# Patient Record
Sex: FEMALE | Race: WHITE
[De-identification: names, ages, dates, MRNs, and addresses within clinical notes are randomized per-mention and may not be internally consistent; named-entity substitution may affect disease eponyms.]

---

## 2017-05-11 NOTE — EDM.PDOC
ED HPI GENERAL MEDICAL PROBLEM





- General


Chief Complaint: Respiratory Problem


Stated Complaint: Cough


Time Seen by Provider: 05/11/17 21:30


Source of Information: Reports: Patient, RN Notes Reviewed


History Limitations: Reports: No Limitations





- History of Present Illness


INITIAL COMMENTS - FREE TEXT/NARRATIVE: 





71 year old female presents to the ED with complaints of a cough productive of 

occasional white/thin sputum. She's had the cough for approximately 10 days. 

She has associated symptoms of hoarse voice, sinus congestion and pressure, 

occasional runny nose, and ear pressure. She denies fever or chills, body aches

, throat pain. No chest pain or shortness of breath. She has a history of 

seasonal allergies and says her allergies do see to be bothering her. She has 

tried afrin for sinus congestion. 





She is not a smoker and has no history of asthma or COPD. 





- Related Data


 Allergies











Allergy/AdvReac Type Severity Reaction Status Date / Time


 


propoxyphene [From Darvon] Allergy  Dizziness Verified 01/07/17 20:23


 


venom-honey bee Allergy  Hives Verified 08/15/16 19:42





[bee venom (honey bee)]     











Home Meds: 


 Home Meds





LORazepam [Ativan] 1 mg PO BID 08/15/16 [History]


buPROPion [Wellbutrin] 300 mg PO DAILY 08/15/16 [History]


Naproxen 500 mg PO BID 01/07/17 [History]


Azithromycin [Zithromax] 250 mg PO DAILY #6 tablet 05/11/17 [Rx]











Past Medical History


Gastrointestinal History: Reports: Chronic Diarrhea


OB/GYN History: Reports: Polycystic Ovaries, Pregnancy, Other (See Below)


Other OB/BYN History: A and P repair


Psychiatric History: Reports: Anxiety





- Past Surgical History


Musculoskeletal Surgical History: Reports: Hip Replacement





Social & Family History





- Family History


Family Medical History: Noncontributory





- Tobacco Use


Smoking Status *Q: Never Smoker





- Caffeine Use


Caffeine Use: Reports: Coffee, Tea





- Recreational Drug Use


Recreational Drug Use: No





ED ROS GENERAL





- Review of Systems


Review Of Systems: See Below


Constitutional: Reports: No Symptoms.  Denies: Fever, Chills, Diaphoresis


HEENT: Reports: Ear Pain, Rhinitis, Sinus Problem, Other (hoarse voice ).  

Denies: Throat Pain


Respiratory: Reports: Cough, Sputum.  Denies: Shortness of Breath


Cardiovascular: Reports: No Symptoms.  Denies: Chest Pain, Edema, 

Lightheadedness


GI/Abdominal: Reports: No Symptoms.  Denies: Abdominal Pain, Diarrhea, Vomiting





ED EXAM, GENERAL





- Physical Exam


Exam: See Below


Exam Limited By: No Limitations


General Appearance: Alert, WD/WN, No Apparent Distress


Ears: Normal External Exam, Normal Canal, Hearing Grossly Normal, Normal TMs, 

Other (fluid behind left TM. no erythema or sign of infection )


Ear Exam: Right Ear: TM normal, Left Ear: TM Dull, Bilateral Ear: Auricle Normal

, Canal Normal


Nose: Normal Inspection, Normal Mucosa


Throat/Mouth: Normal Inspection, Normal Oropharynx, Other (hoarse voice )


Neck: Normal Inspection, Supple, Non-Tender


Respiratory/Chest: No Respiratory Distress, Lungs Clear, Normal Breath Sounds, 

No Accessory Muscle Use, Chest Non-Tender, Other (occasional dry sounding cough)


Cardiovascular: Regular Rate, Rhythm, No Edema, No Murmur


Neurological: Alert, Oriented, Normal Cognition


Skin Exam: Warm, Dry, Intact





Course





- Vital Signs


Last Recorded V/S: 


 Last Vital Signs











Temp  97.8 F   05/11/17 21:19


 


Pulse  94   05/11/17 21:19


 


Resp  20   05/11/17 21:19


 


BP  133/82   05/11/17 21:19


 


Pulse Ox  98   05/11/17 21:19














Departure





- Departure


Time of Disposition: 21:42


Disposition: Home, Self-Care 01


Condition: good


Clinical Impression: 


 Bronchitis





Allergic rhinitis


Qualifiers:


 Allergic rhinitis trigger: unspecified Allergic rhinitis seasonality: seasonal 

Qualified Code(s): J30.2 - Other seasonal allergic rhinitis








- Discharge Information


Prescriptions: 


Azithromycin [Zithromax] 250 mg PO DAILY #6 tablet


Instructions:  Acute Bronchitis, Easy-to-Read, Allergic Rhinitis


Referrals: 


Tyra Pelletier MD [Primary Care Provider] - 


Forms:  ED Department Discharge


Additional Instructions: 


Zithromax 2 tabs on day 1, then 1 tab daily for next 4 days


Flonase 1 spray to each nostril twice a day for allergies


Zyrtec or Claritin once a day for allergy symptoms


Tylenol as needed for discomfort


Follow-up in clinic next week if not improved


Return to ER with worsening of symptoms

## 2017-06-20 NOTE — PCM.PREANE
Preanesthetic Assessment





- Anesthesia/Transfusion/Family Hx


Anesthesia History: Prior Anesthesia Without Reaction


Family History of Anesthesia Reaction: No


Transfusion History: No Prior Transfusion(s)





- Review of Systems


General: No Symptoms, Other


Pulmonary: Other (s/p bronchitis 3 weeks ago, no residual )


Cardiovascular: Chest Pain (rarely, s/p workup 2 weeks ago, all negative)


Gastrointestinal: No symptoms


Neurological: No Symptoms


Other: Reports: Depression





- Physical Assessment


NPO Status Date: 06/19/17


NPO Status Time: 21:00


Pulse: 71


O2 Sat by Pulse Oximetry: 98


Respiratory Rate: 16


Blood Pressure: 118/68


Vital Signs: 





 Last Vital Signs











Temp  36.5 C   06/20/17 09:35


 


Pulse  71   06/20/17 09:35


 


Resp  16   06/20/17 09:35


 


BP  118/68   06/20/17 09:35


 


Pulse Ox  98   06/20/17 09:35











Height: 1.63 m


Weight: 68.946 kg


ASA Class: 2


Mental Status: Alert & Oriented x3


Airway Class: Mallampati = 2


Dentition: Reports: Normal Dentition


Thyro-Mental Finger Breadths: 2


Mouth Opening Finger Breadths: 2


ROM/Head Extension: Full


Lungs: Clear to auscultation, Normal respiratory effort


Cardiovascular: Regular Rate, Regular Rhythm





- Allergies


Allergies/Adverse Reactions: 


 Allergies











Allergy/AdvReac Type Severity Reaction Status Date / Time


 


venom-honey bee Allergy  Hives Verified 06/19/17 16:02





[bee venom (honey bee)]     


 


propoxyphene [From Darvon] AdvReac  Dizziness Verified 06/19/17 16:02














- Blood


Blood Available: No


Product(s) Available: None





- Anesthesia Plan


Pre-Op Medication Ordered: None





- Acknowledgements


Anesthesia Type Planned: MAC


Pt an Appropriate Candidate for the Planned Anesthesia: Yes


Alternatives and Risks of Anesthesia Discussed w Pt/Guardian: Yes


Pt/Guardian Understands and Agrees with Anesthesia Plan: Yes





PreAnesthesia Questionnaire


Gastrointestinal History: Reports: Chronic Diarrhea


OB/GYN History: Reports: Polycystic Ovaries, Pregnancy, Other (See Below)


Other OB/BYN History: A and P repair


Psychiatric History: Reports: Anxiety





- Past Surgical History


Musculoskeletal Surgical History: Reports: Hip Replacement





- SUBSTANCE USE


Smoking Status *Q: Never Smoker


Recreational Drug Use History: No





- HOME MEDS


Home Medications: 


 Home Meds





LORazepam [Ativan] 1 mg PO BID 08/15/16 [History]


buPROPion [Wellbutrin] 300 mg PO DAILY 08/15/16 [History]


Naproxen 500 mg PO BID 01/07/17 [History]











- CURRENT (IN HOUSE) MEDS


Current Meds: 





 Current Medications





Brimonidine Tartrate (Alphagan 0.2% Ophth Soln)  0 ml EYELF ASDIRECTED SHAWN


   Stop: 06/21/17 18:00


   Last Admin: 06/20/17 09:51 Dose:  1 drop


Cefuroxime Sodium (Zinacef)  0 mg EYELF ASDIRECTED SHAWN


   Stop: 06/20/17 18:00


Lidocaine HCl (Xylocaine-Mpf 1%)  10 ml INJECT ASDIRECTED SHAWN


   Stop: 06/20/17 18:00


Phenylephrine HCl (Hever-Synephrine 2.5% Ophth Soln)  0 ml EYELF ASDIRECTED SHAWN


   Stop: 06/20/17 18:00


   Last Admin: 06/20/17 09:57 Dose:  1 drop


Pilocarpine HCl (Pilocar 4% Ophth Soln)  0 ml EYELF ASDIRECTED SHAWN


   Stop: 06/20/17 18:00


Polymyxin/Trimethoprim Sulfate (Polytrim Ophth Soln)  0 ml EYELF ASDIRECTED SHAWN


   Stop: 06/20/17 18:00


   Last Admin: 06/20/17 09:46 Dose:  1 drop


Tetracaine HCl (Tetracaine 0.5% Steri-Unit Sol)  0 ml EYELF ASDIRECTED SHAWN


   Stop: 06/20/17 18:00


Tropicamide (Mydriacyl 1% Ophth Soln)  0 ml EYELF ASDIRECTED SHAWN


   Stop: 06/20/17 18:00


   Last Admin: 06/20/17 10:01 Dose:  1 drop

## 2017-07-18 NOTE — PCM.PREANE
Preanesthetic Assessment





- Anesthesia/Transfusion/Family Hx


Anesthesia History: Prior Anesthesia Without Reaction


Family History of Anesthesia Reaction: No


Transfusion History: No Prior Transfusion(s)





- Review of Systems


General: No Symptoms


Pulmonary: No Symptoms


Cardiovascular: No Symptoms


Gastrointestinal: No symptoms


Neurological: No Symptoms


Other: Reports: None





- Physical Assessment


NPO Status Date: 07/17/17


NPO Status Time: 00:00


Pulse: 78


O2 Sat by Pulse Oximetry: 98


Respiratory Rate: 16


Blood Pressure: 114/63


Temperature: 36.3 C


Height: 1.63 m


Weight: 68.946 kg


ASA Class: 2


Mental Status: Alert & Oriented x3


Airway Class: Mallampati = 2


Dentition: Reports: Broken Tooth/Teeth (front top)


Thyro-Mental Finger Breadths: 3


Mouth Opening Finger Breadths: 2


ROM/Head Extension: Full


Lungs: Clear to auscultation, Normal respiratory effort


Cardiovascular: Regular Rate, Regular Rhythm





- Allergies


Allergies/Adverse Reactions: 


 Allergies











Allergy/AdvReac Type Severity Reaction Status Date / Time


 


venom-honey bee Allergy  Hives Verified 07/17/17 14:29





[bee venom (honey bee)]     


 


propoxyphene [From Darvon] AdvReac  Dizziness Verified 07/17/17 14:29














- Blood


Blood Available: No


Product(s) Available: None





- Anesthesia Plan


Pre-Op Medication Ordered: None





- Acknowledgements


Anesthesia Type Planned: MAC


Pt an Appropriate Candidate for the Planned Anesthesia: Yes


Alternatives and Risks of Anesthesia Discussed w Pt/Guardian: Yes


Pt/Guardian Understands and Agrees with Anesthesia Plan: Yes





PreAnesthesia Questionnaire


Gastrointestinal History: Reports: Chronic Diarrhea


OB/GYN History: Reports: Polycystic Ovaries, Pregnancy, Other (See Below)


Other OB/BYN History: A and P repair


Psychiatric History: Reports: Anxiety





- Past Surgical History


Musculoskeletal Surgical History: Reports: Hip Replacement





- SUBSTANCE USE


Smoking Status *Q: Never Smoker


Tobacco Use Within Last Twelve Months: No


Second Hand Smoke Exposure: No


Days Per Week of Alcohol Use: 1


Number of Drinks Per Day: 0


Total Drinks Per Week: 0


Recreational Drug Use History: No





- HOME MEDS


Home Medications: 


 Home Meds





LORazepam [Ativan] 1 mg PO BID 08/15/16 [History]


buPROPion [Wellbutrin] 300 mg PO DAILY 08/15/16 [History]


Naproxen 500 mg PO BID 01/07/17 [History]











- CURRENT (IN HOUSE) MEDS


Current Meds: 





 Current Medications





Brimonidine Tartrate (Alphagan 0.2% Ophth Soln)  0 ml EYERT ASDIRECTED SHAWN


   Stop: 07/18/17 16:00


Cefuroxime Sodium (Zinacef)  0 mg EYERT ASDIRECTED SHAWN


   Stop: 07/18/17 18:00


Lidocaine HCl (Xylocaine-Mpf 1%)  10 ml INJECT ASDIRECTED SHAWN


   Stop: 07/18/17 18:00


Phenylephrine HCl (Hever-Synephrine 2.5% Ophth Soln)  0 ml EYERT ASDIRECTED SHAWN


   Stop: 07/18/17 16:00


Pilocarpine HCl (Pilocar 4% Ophth Soln)  0 ml EYERT ASDIRECTED SHAWN


   Stop: 07/18/17 16:00


Polymyxin/Trimethoprim Sulfate (Polytrim Ophth Soln)  0 ml EYERT ASDIRECTED SHAWN


   Stop: 07/18/17 16:00


   Last Admin: 07/18/17 09:44 Dose:  1 drop


Tetracaine HCl (Tetracaine 0.5% Steri-Unit Sol)  0 ml EYERT ASDIRECTED SHAWN


   Stop: 07/18/17 16:00


Tropicamide (Mydriacyl 1% Ophth Soln)  0 ml EYERT ASDIRECTED SHAWN


   Stop: 07/18/17 16:00

## 2019-08-28 ENCOUNTER — HOSPITAL ENCOUNTER (EMERGENCY)
Dept: HOSPITAL 41 - JD.ED | Age: 74
Discharge: HOME | End: 2019-08-28
Payer: MEDICARE

## 2019-08-28 VITALS — SYSTOLIC BLOOD PRESSURE: 132 MMHG | DIASTOLIC BLOOD PRESSURE: 81 MMHG

## 2019-08-28 DIAGNOSIS — Z91.030: ICD-10-CM

## 2019-08-28 DIAGNOSIS — T78.40XA: Primary | ICD-10-CM

## 2019-08-28 DIAGNOSIS — Z88.8: ICD-10-CM

## 2019-08-28 DIAGNOSIS — F41.9: ICD-10-CM

## 2019-08-28 DIAGNOSIS — Z79.899: ICD-10-CM

## 2019-08-28 NOTE — EDM.PDOC
ED HPI GENERAL MEDICAL PROBLEM





- General


Chief Complaint: Bite:Animal, Insect


Stated Complaint: ALLERGIC TO BEES/ STUNG BY BEE TODAY


Time Seen by Provider: 08/28/19 17:05


Source of Information: Reports: Patient


History Limitations: Reports: No Limitations





- History of Present Illness


INITIAL COMMENTS - FREE TEXT/NARRATIVE: 





73-year-old female presents for a wasp sting to the left hand. Patient reports 

she was bit by a wasp around 10:30 or 11:00 this morning while she was watering 

her garden. She states that it swelled immediately. She did take 25 mg of 

Benadryl earlier today, none since. She has been icing the area but continues 

to have pain. She denies any hives, difficulty breathing, throat swelling or 

throat pain. She has had reactions to Bee and wasp stings in the past. She's 

never had any anaphylactic reactions.


Onset: Today, Sudden


  ** Left Hand


Pain Score (Numeric/FACES): 6





- Related Data


 Allergies











Allergy/AdvReac Type Severity Reaction Status Date / Time


 


venom-honey bee Allergy  Hives Verified 08/28/19 15:31





[bee venom (honey bee)]     


 


propoxyphene [From Darvon] AdvReac  Dizziness Verified 08/28/19 15:31











Home Meds: 


 Home Meds





buPROPion [Wellbutrin] 300 mg PO DAILY 08/15/16 [History]


Naproxen 500 mg PO BID 01/07/17 [History]


ClonazePAM [KlonoPIN] 0.5 mg PO BID PRN 08/28/19 [History]











Past Medical History


Gastrointestinal History: Reports: Chronic Diarrhea


OB/GYN History: Reports: Polycystic Ovaries, Pregnancy, Other (See Below)


Other OB/GYN History: A and P repair


Psychiatric History: Reports: Anxiety





- Past Surgical History


Musculoskeletal Surgical History: Reports: Hip Replacement





Social & Family History





- Family History


Family Medical History: Noncontributory





- Tobacco Use


Smoking Status *Q: Never Smoker





- Caffeine Use


Caffeine Use: Reports: Coffee





- Recreational Drug Use


Recreational Drug Use: No





ED ROS GENERAL





- Review of Systems


Review Of Systems: See Below


HEENT: Denies: Throat Pain, Throat Swelling


Respiratory: Denies: Shortness of Breath


Skin: Reports: Pruritis, Other (Swelling to the left dorsal hand).  Denies: Rash





ED EXAM, ANIMAL BITE





- Physical Exam


Exam: See Below


Exam Limited By: No Limitations


General Appearance: Alert, WD/WN, No Apparent Distress


Eye Exam: Bilateral Eye: Normal Inspection, PERRL


Ears: Normal External Exam


Nose: Normal Inspection.  No: Nasal Flaring


Throat/Mouth: Normal Inspection, Normal Lips, Normal Voice, No Airway Compromise

, Other (no uvula sweling)


Respiratory/Chest: No Respiratory Distress, Lungs Clear, Normal Breath Sounds


Cardiovascular: Normal Peripheral Pulses, Regular Rate, Rhythm, No Murmur


Neurological: Alert, Oriented, Normal Cognition


Psychiatric: Normal Affect, Normal Mood


Skin Exam: Normal Color, Warm/Dry, Other (slight erythema to the left dorsal 

hand approximately 8cm in diameter, slight increased warmth, associated swelling

)





Course





- Vital Signs


Last Recorded V/S: 


 Last Vital Signs











Temp      


 


Pulse  78   08/28/19 15:27


 


Resp  14   08/28/19 15:27


 


BP  132/81   08/28/19 15:27


 


Pulse Ox  100   08/28/19 15:27














- Re-Assessments/Exams


Free Text/Narrative Re-Assessment/Exam: 





08/28/19 17:27


local allergic reaction. Recommend an antihistamine, ice, compression and 

elevation. No indication for steroids at this time. No indication for 

antibiotics at this time.





Discharge instructions as documented.





Departure





- Departure


Time of Disposition: 17:27


Disposition: Home, Self-Care 01


Condition: Good


Clinical Impression: 


 Allergic reaction








- Discharge Information


*PRESCRIPTION DRUG MONITORING PROGRAM REVIEWED*: No


*COPY OF PRESCRIPTION DRUG MONITORING REPORT IN PATIENT CAROL: No


Instructions:  Insect Bite, Adult


Referrals: 


Tyra Pelletier MD [Primary Care Provider] - 


Forms:  ED Department Discharge


Additional Instructions: 


Recommended an antihistamine. Take Benadryl every 6 hours or you may take 

another antihistamine such as Claritin, Allegra or Zyrtec as the package 

directs. Take this for the next 3-5 days until the swelling subsides.





Ice and elevate the hand is much as you able to. Recommend using an Ace bandage 

for compression. 





The swelling may get slightly worse before it gets better. Follow-up with your 

primary care provider or return to the ER if you are notice purulent drainage, 

fevers or erythema to the area. 





Please return to the ER for symptoms change or worsen.
